# Patient Record
Sex: FEMALE | NOT HISPANIC OR LATINO | URBAN - METROPOLITAN AREA
[De-identification: names, ages, dates, MRNs, and addresses within clinical notes are randomized per-mention and may not be internally consistent; named-entity substitution may affect disease eponyms.]

---

## 2018-01-16 ENCOUNTER — EMERGENCY (EMERGENCY)
Facility: HOSPITAL | Age: 53
LOS: 1 days | Discharge: HOME | End: 2018-01-16
Attending: EMERGENCY MEDICINE | Admitting: EMERGENCY MEDICINE
Payer: COMMERCIAL

## 2018-01-16 VITALS
HEART RATE: 96 BPM | DIASTOLIC BLOOD PRESSURE: 69 MMHG | TEMPERATURE: 99 F | SYSTOLIC BLOOD PRESSURE: 114 MMHG | OXYGEN SATURATION: 97 % | RESPIRATION RATE: 17 BRPM
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
## 2018-01-16 DIAGNOSIS — S32.14XA TYPE 1 FRACTURE OF SACRUM, INITIAL ENCOUNTER FOR CLOSED FRACTURE: ICD-10-CM
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
## 2018-01-16 DIAGNOSIS — M54.5 LOW BACK PAIN: ICD-10-CM

## 2018-01-16 PROCEDURE — 72131 CT LUMBAR SPINE W/O DYE: CPT | Mod: 26

## 2018-01-16 PROCEDURE — 72148 MRI LUMBAR SPINE W/O DYE: CPT | Mod: 26

## 2018-01-16 PROCEDURE — 99284 EMERGENCY DEPT VISIT MOD MDM: CPT

## 2018-01-16 NOTE — ED PROVIDER NOTE - OBJECTIVE STATEMENT
53 y/o Female [place] [facility] after c/o back pain and saddle anesthesia after a mechanical fall 3 days ago. Sent to the ER to r/o Cauda Equina syndrome. Pt states [place] 3 [place] and landed on her buttocks. She felt severe pain radiating through lower back. No leg weakness, able to go home and noticed she had some numbness and tinging of her perineal area. She is unable to feel herself urinating and has had 2 days of constipation. Today, she walked into the ER and states symptoms slightly improved but not resolved. No fever, no chills, and no leg weakness.

## 2018-01-17 ENCOUNTER — TRANSCRIPTION ENCOUNTER (OUTPATIENT)
Age: 53
End: 2018-01-17

## 2018-01-17 PROBLEM — Z00.00 ENCOUNTER FOR PREVENTIVE HEALTH EXAMINATION: Status: ACTIVE | Noted: 2018-01-17

## 2018-01-19 ENCOUNTER — APPOINTMENT (OUTPATIENT)
Dept: ORTHOPEDIC SURGERY | Facility: CLINIC | Age: 53
End: 2018-01-19
Payer: COMMERCIAL

## 2018-01-19 VITALS — WEIGHT: 140 LBS | BODY MASS INDEX: 25.76 KG/M2 | HEIGHT: 62 IN | RESPIRATION RATE: 16 BRPM

## 2018-01-19 DIAGNOSIS — M47.816 SPONDYLOSIS W/OUT MYELOPATHY OR RADICULOPATHY, LUMBAR REGION: ICD-10-CM

## 2018-01-19 DIAGNOSIS — M43.17 SPONDYLOLISTHESIS, LUMBOSACRAL REGION: ICD-10-CM

## 2018-01-19 DIAGNOSIS — G54.8 OTHER NERVE ROOT AND PLEXUS DISORDERS: ICD-10-CM

## 2018-01-19 DIAGNOSIS — S30.0XXA CONTUSION OF LOWER BACK AND PELVIS, INITIAL ENCOUNTER: ICD-10-CM

## 2018-01-19 PROCEDURE — 99204 OFFICE O/P NEW MOD 45 MIN: CPT

## 2018-01-22 PROBLEM — G54.8 TARLOV CYST: Status: ACTIVE | Noted: 2018-01-22

## 2018-01-22 PROBLEM — M47.816 LUMBAR SPONDYLOSIS: Status: ACTIVE | Noted: 2018-01-22

## 2018-01-22 PROBLEM — S30.0XXA CONTUSION OF SACRUM, INITIAL ENCOUNTER: Status: ACTIVE | Noted: 2018-01-22

## 2018-01-22 PROBLEM — M43.17 SPONDYLOLISTHESIS OF LUMBOSACRAL REGION: Status: ACTIVE | Noted: 2018-01-22

## 2023-10-05 NOTE — ED ADULT NURSE NOTE - ATTEMPT TO OOB
Administered By (Optional): Dr. Dunbar X Size Of Lesion In Cm (Optional): 0 Consent: The risks of atrophy were reviewed with the patient. Detail Level: Detailed Kenalog Type Of Vial: Multiple Dose Bill For Wasted Drug?: no Medical Necessity Clause: This procedure was medically necessary because the lesions that were treated were: Validate Note Data When Using Inventory: Yes Kenalog Preparation: Kenalog Concentration Of Solution Injected (Mg/Ml): 20.0 Total Volume Injected (Ccs- Only Use Numbers And Decimals): 0.35 no
